# Patient Record
Sex: FEMALE | Race: WHITE | NOT HISPANIC OR LATINO | URBAN - METROPOLITAN AREA
[De-identification: names, ages, dates, MRNs, and addresses within clinical notes are randomized per-mention and may not be internally consistent; named-entity substitution may affect disease eponyms.]

---

## 2017-05-16 ENCOUNTER — IMPORTED ENCOUNTER (OUTPATIENT)
Dept: URBAN - METROPOLITAN AREA CLINIC 50 | Facility: CLINIC | Age: 82
End: 2017-05-16

## 2017-05-19 ENCOUNTER — IMPORTED ENCOUNTER (OUTPATIENT)
Dept: URBAN - METROPOLITAN AREA CLINIC 50 | Facility: CLINIC | Age: 82
End: 2017-05-19

## 2017-07-07 ENCOUNTER — IMPORTED ENCOUNTER (OUTPATIENT)
Dept: URBAN - METROPOLITAN AREA CLINIC 50 | Facility: CLINIC | Age: 82
End: 2017-07-07

## 2018-05-19 ENCOUNTER — IMPORTED ENCOUNTER (OUTPATIENT)
Dept: URBAN - METROPOLITAN AREA CLINIC 50 | Facility: CLINIC | Age: 83
End: 2018-05-19

## 2019-07-15 ENCOUNTER — IMPORTED ENCOUNTER (OUTPATIENT)
Dept: URBAN - METROPOLITAN AREA CLINIC 50 | Facility: CLINIC | Age: 84
End: 2019-07-15

## 2019-07-19 ENCOUNTER — IMPORTED ENCOUNTER (OUTPATIENT)
Dept: URBAN - METROPOLITAN AREA CLINIC 50 | Facility: CLINIC | Age: 84
End: 2019-07-19

## 2019-08-23 ENCOUNTER — IMPORTED ENCOUNTER (OUTPATIENT)
Dept: URBAN - METROPOLITAN AREA CLINIC 50 | Facility: CLINIC | Age: 84
End: 2019-08-23

## 2019-09-23 ENCOUNTER — IMPORTED ENCOUNTER (OUTPATIENT)
Dept: URBAN - METROPOLITAN AREA CLINIC 50 | Facility: CLINIC | Age: 84
End: 2019-09-23

## 2019-10-04 ENCOUNTER — IMPORTED ENCOUNTER (OUTPATIENT)
Dept: URBAN - METROPOLITAN AREA CLINIC 50 | Facility: CLINIC | Age: 84
End: 2019-10-04

## 2019-10-04 NOTE — PATIENT DISCUSSION
"""Continue Artificial tears both eyes two - four times a day ."" ""Continue Gel drops both eyes at bedtime . "" ""Continue Warm compresses both eyes twice a day ."" ""Continue Flaxseed oil 1000 mg by mouth twice a day ."" ""Continue Restasis both eyes twice a day . """

## 2020-01-23 ENCOUNTER — IMPORTED ENCOUNTER (OUTPATIENT)
Dept: URBAN - METROPOLITAN AREA CLINIC 50 | Facility: CLINIC | Age: 85
End: 2020-01-23

## 2020-09-30 ENCOUNTER — IMPORTED ENCOUNTER (OUTPATIENT)
Dept: URBAN - METROPOLITAN AREA CLINIC 50 | Facility: CLINIC | Age: 85
End: 2020-09-30

## 2021-02-10 ENCOUNTER — IMPORTED ENCOUNTER (OUTPATIENT)
Dept: URBAN - METROPOLITAN AREA CLINIC 50 | Facility: CLINIC | Age: 86
End: 2021-02-10

## 2021-02-10 NOTE — PATIENT DISCUSSION
"""Dry eye OU. Explained to patient there is no cure for dry eye. Patient is very irritated.  Patient ""

## 2021-03-03 ENCOUNTER — IMPORTED ENCOUNTER (OUTPATIENT)
Dept: URBAN - METROPOLITAN AREA CLINIC 50 | Facility: CLINIC | Age: 86
End: 2021-03-03

## 2021-04-17 ASSESSMENT — VISUAL ACUITY
OS_CC: 20/30-2
OD_PH: 20/60
OD_CC: J2
OS_CC: 20/50-2
OD_CC: J2@ 16 IN
OS_PH: 20/30-
OS_SC: 20/100
OS_CC: J2@ 16 IN
OD_CC: 20/70
OS_CC: 20/50
OS_BAT: >20/400
OD_PH: 20/50
OD_CC: 20/60-2
OD_CC: 20/50-
OD_CC: 20/80
OS_CC: J2@ 16 IN
OD_PH: 20/50
OD_CC: 20/70+
OS_CC: 20/80
OD_SC: 20/80
OS_CC: 20/50-2
OD_CC: J2@ 16 IN
OD_BAT: 20/70+
OD_CC: 20/50
OD_CC: 20/60-1
OS_CC: 20/50-
OS_CC: J2
OS_CC: 20/30
OD_PH: 20/60+1
OS_CC: 20/40-2
OD_CC: 20/100
OD_PH: 20/60-1
OS_PH: 20/50
OS_CC: 20/40-1
OD_CC: 20/70-1
OS_OTHER: >20/400.

## 2021-04-17 ASSESSMENT — TONOMETRY
OD_IOP_MMHG: 13
OD_IOP_MMHG: 11
OS_IOP_MMHG: 16
OS_IOP_MMHG: 13
OD_IOP_MMHG: 13
OD_IOP_MMHG: 14
OD_IOP_MMHG: 16
OS_IOP_MMHG: 13
OS_IOP_MMHG: 13
OS_IOP_MMHG: 15
OS_IOP_MMHG: 13
OD_IOP_MMHG: 16
OS_IOP_MMHG: 16
OD_IOP_MMHG: 13
OS_IOP_MMHG: 14
OD_IOP_MMHG: 10
OS_IOP_MMHG: 13
OD_IOP_MMHG: 16
OS_IOP_MMHG: 11

## 2021-10-04 ENCOUNTER — PREPPED CHART (OUTPATIENT)
Dept: URBAN - METROPOLITAN AREA CLINIC 52 | Facility: CLINIC | Age: 86
End: 2021-10-04

## 2022-04-22 NOTE — PATIENT DISCUSSION
Calcium 1200-1500mg + 600-1000 IU Vit D daily  Exercise 150 minutes per week minimum including weight bearing exercises  Pap with high risk HPV Q 3-5 years  Annual mammogram and monthly breast self exam recommended  Colonoscopy-  encouraged        Kegels 20 times twice daily  Silicone based lubricant with sex  Vaginal moisturizers twice weekly as needed  My findings and recommendations are based on patient's symptoms, eye exam, diagnostic testing, and records.